# Patient Record
Sex: MALE | Race: BLACK OR AFRICAN AMERICAN | NOT HISPANIC OR LATINO | URBAN - METROPOLITAN AREA
[De-identification: names, ages, dates, MRNs, and addresses within clinical notes are randomized per-mention and may not be internally consistent; named-entity substitution may affect disease eponyms.]

---

## 2017-02-04 ENCOUNTER — EMERGENCY (EMERGENCY)
Facility: HOSPITAL | Age: 67
LOS: 0 days | Discharge: HOME | End: 2017-02-05
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
## 2017-06-27 DIAGNOSIS — S80.11XA CONTUSION OF RIGHT LOWER LEG, INITIAL ENCOUNTER: ICD-10-CM

## 2017-06-27 DIAGNOSIS — M79.661 PAIN IN RIGHT LOWER LEG: ICD-10-CM
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
## 2019-06-17 ENCOUNTER — APPOINTMENT (OUTPATIENT)
Dept: UROLOGY | Facility: CLINIC | Age: 69
End: 2019-06-17
Payer: COMMERCIAL

## 2019-06-17 DIAGNOSIS — H40.9 UNSPECIFIED GLAUCOMA: ICD-10-CM

## 2019-06-17 DIAGNOSIS — Z82.3 FAMILY HISTORY OF STROKE: ICD-10-CM

## 2019-06-17 DIAGNOSIS — Z63.4 DISAPPEARANCE AND DEATH OF FAMILY MEMBER: ICD-10-CM

## 2019-06-17 PROCEDURE — 99203 OFFICE O/P NEW LOW 30 MIN: CPT

## 2019-06-17 SDOH — SOCIAL STABILITY - SOCIAL INSECURITY: DISSAPEARANCE AND DEATH OF FAMILY MEMBER: Z63.4

## 2019-06-17 NOTE — ASSESSMENT
[FreeTextEntry1] : This is a 68 year male who presents for evaluation of elevated PSA\par \par IPSS: 11-- mainly frequency and urgency\par \par [facility] care chart records accessed and reviewed at [email or web address].  Findings summarized below: \par April 2019\par UA - neg nit and LE\par PSA 3.36\par was 2.26 one year ago

## 2019-06-17 NOTE — HISTORY OF PRESENT ILLNESS
[FreeTextEntry1] : This is a 68 year male who presents for evaluation of elevated PSA\par \par IPSS: 11-- mainly frequency and urgency\par \par [facility] care chart records accessed and reviewed at [email or web address].  Findings summarized below: \par April 2019\par UA - neg nit and LE\par PSA 3.36\par was 2.26 one year ago

## 2019-06-17 NOTE — PHYSICAL EXAM
[General Appearance - Well Developed] : well developed [General Appearance - Well Nourished] : well nourished [Normal Appearance] : normal appearance [Well Groomed] : well groomed [General Appearance - In No Acute Distress] : no acute distress [Respiration, Rhythm And Depth] : normal respiratory rhythm and effort [Edema] : no peripheral edema [Exaggerated Use Of Accessory Muscles For Inspiration] : no accessory muscle use [Abdomen Soft] : soft [Abdomen Tenderness] : non-tender [Costovertebral Angle Tenderness] : no ~M costovertebral angle tenderness [Normal Station and Gait] : the gait and station were normal for the patient's age [No Focal Deficits] : no focal deficits [] : no rash [Oriented To Time, Place, And Person] : oriented to person, place, and time [Affect] : the affect was normal [Not Anxious] : not anxious [Mood] : the mood was normal [No Palpable Adenopathy] : no palpable adenopathy [Urethral Meatus] : meatus normal [Skin Color & Pigmentation] : normal skin color and pigmentation [FreeTextEntry1] : prostate too deep unable to palpate prostate

## 2019-06-20 ENCOUNTER — LABORATORY RESULT (OUTPATIENT)
Age: 69
End: 2019-06-20

## 2019-06-20 ENCOUNTER — OUTPATIENT (OUTPATIENT)
Dept: OUTPATIENT SERVICES | Facility: HOSPITAL | Age: 69
LOS: 1 days | Discharge: HOME | End: 2019-06-20

## 2019-06-20 DIAGNOSIS — R97.20 ELEVATED PROSTATE SPECIFIC ANTIGEN [PSA]: ICD-10-CM

## 2019-09-16 ENCOUNTER — APPOINTMENT (OUTPATIENT)
Dept: UROLOGY | Facility: CLINIC | Age: 69
End: 2019-09-16

## 2019-10-08 ENCOUNTER — OUTPATIENT (OUTPATIENT)
Dept: OUTPATIENT SERVICES | Facility: HOSPITAL | Age: 69
LOS: 1 days | Discharge: HOME | End: 2019-10-08

## 2019-10-08 VITALS
HEART RATE: 61 BPM | OXYGEN SATURATION: 100 % | RESPIRATION RATE: 17 BRPM | SYSTOLIC BLOOD PRESSURE: 151 MMHG | DIASTOLIC BLOOD PRESSURE: 71 MMHG

## 2019-10-08 VITALS
RESPIRATION RATE: 17 BRPM | WEIGHT: 175.05 LBS | HEART RATE: 54 BPM | OXYGEN SATURATION: 99 % | DIASTOLIC BLOOD PRESSURE: 82 MMHG | SYSTOLIC BLOOD PRESSURE: 169 MMHG | TEMPERATURE: 97 F | HEIGHT: 67 IN

## 2019-10-08 DIAGNOSIS — Z98.890 OTHER SPECIFIED POSTPROCEDURAL STATES: Chronic | ICD-10-CM

## 2019-10-08 RX ORDER — ONDANSETRON 8 MG/1
4 TABLET, FILM COATED ORAL ONCE
Refills: 0 | Status: DISCONTINUED | OUTPATIENT
Start: 2019-10-08 | End: 2019-10-25

## 2019-10-08 RX ORDER — ACETAMINOPHEN 500 MG
650 TABLET ORAL ONCE
Refills: 0 | Status: DISCONTINUED | OUTPATIENT
Start: 2019-10-08 | End: 2019-10-25

## 2019-10-08 NOTE — CHART NOTE - NSCHARTNOTEFT_GEN_A_CORE
PACU ANESTHESIA ADMISSION NOTE      Procedure:   Post op diagnosis:      ____  Intubated  TV:______       Rate: ______      FiO2: ______    _x___  Patent Airway    x____  Full return of protective reflexes    __x__  Full recovery from anesthesia / back to baseline status    Vitals:  T(C): 35.9 (10-08-19 @ 08:44), Max: 35.9 (10-08-19 @ 08:01)  HR: 54 (10-08-19 @ 08:44) (54 - 54)  BP: 169/82 (10-08-19 @ 08:44) (169/82 - 169/82)  RR: 17 (10-08-19 @ 08:44) (17 - 17)  SpO2: 99% (10-08-19 @ 08:44) (99% - 99%)    Mental Status:  x____ Awake   _x____ Alert   _____ Drowsy   _____ Sedated    Nausea/Vomiting:  ____ NO  ___x___Yes,   See Post - Op Orders          Pain Scale (0-10):  _____    Treatment: ____ None    _x___ See Post - Op/PCA Orders    Post - Operative Fluids:   ____ Oral   __x__ See Post - Op Orders    Plan: Discharge:   _x___Home       _____Floor     _____Critical Care    _____  Other:_________________    Comments: uneventful anesthesia course no complications. VItals stable. Pt transferred to PACU

## 2019-10-11 DIAGNOSIS — H26.9 UNSPECIFIED CATARACT: ICD-10-CM

## 2019-10-11 DIAGNOSIS — H40.10X0 UNSPECIFIED OPEN-ANGLE GLAUCOMA, STAGE UNSPECIFIED: ICD-10-CM

## 2019-10-11 PROBLEM — Z91.09 OTHER ALLERGY STATUS, OTHER THAN TO DRUGS AND BIOLOGICAL SUBSTANCES: Chronic | Status: ACTIVE | Noted: 2019-10-08

## 2019-10-14 ENCOUNTER — APPOINTMENT (OUTPATIENT)
Dept: UROLOGY | Facility: CLINIC | Age: 69
End: 2019-10-14
Payer: MEDICARE

## 2019-10-14 ENCOUNTER — LABORATORY RESULT (OUTPATIENT)
Age: 69
End: 2019-10-14

## 2019-10-14 ENCOUNTER — OUTPATIENT (OUTPATIENT)
Dept: OUTPATIENT SERVICES | Facility: HOSPITAL | Age: 69
LOS: 1 days | Discharge: HOME | End: 2019-10-14

## 2019-10-14 DIAGNOSIS — Z98.890 OTHER SPECIFIED POSTPROCEDURAL STATES: Chronic | ICD-10-CM

## 2019-10-14 PROCEDURE — 76872 US TRANSRECTAL: CPT

## 2019-10-14 PROCEDURE — 55700: CPT

## 2019-10-15 DIAGNOSIS — R97.20 ELEVATED PROSTATE SPECIFIC ANTIGEN [PSA]: ICD-10-CM

## 2019-10-22 ENCOUNTER — OUTPATIENT (OUTPATIENT)
Dept: OUTPATIENT SERVICES | Facility: HOSPITAL | Age: 69
LOS: 1 days | Discharge: HOME | End: 2019-10-22

## 2019-10-22 VITALS
RESPIRATION RATE: 17 BRPM | WEIGHT: 175.05 LBS | TEMPERATURE: 97 F | HEIGHT: 67 IN | OXYGEN SATURATION: 98 % | DIASTOLIC BLOOD PRESSURE: 79 MMHG | HEART RATE: 52 BPM | SYSTOLIC BLOOD PRESSURE: 153 MMHG

## 2019-10-22 VITALS — SYSTOLIC BLOOD PRESSURE: 145 MMHG | HEART RATE: 60 BPM | RESPIRATION RATE: 18 BRPM | DIASTOLIC BLOOD PRESSURE: 70 MMHG

## 2019-10-22 DIAGNOSIS — Z98.890 OTHER SPECIFIED POSTPROCEDURAL STATES: Chronic | ICD-10-CM

## 2019-10-22 NOTE — CHART NOTE - NSCHARTNOTEFT_GEN_A_CORE
PACU ANESTHESIA ADMISSION NOTE      Procedure:   Post op diagnosis:      ____  Intubated  TV:______       Rate: ______      FiO2: ______    __x__  Patent Airway    __xx__  Full return of protective reflexes    _x___  Full recovery from anesthesia / back to baseline status    Vitals:  T(C): 35.9 (10-22-19 @ 10:20), Max: 35.9 (10-22-19 @ 09:25)  HR: 52 (10-22-19 @ 10:20) (52 - 52)  BP: 153/79 (10-22-19 @ 10:20) (153/79 - 153/79)  RR: 17 (10-22-19 @ 10:20) (17 - 17)  SpO2: 98% (10-22-19 @ 10:20) (98% - 98%)    Mental Status:  _x___ Awake  x____ Alert   _____ Drowsy   _____ Sedated    Nausea/Vomiting:  ____ NO  ____x__Yes,   See Post - Op Orders          Pain Scale (0-10):  _____    Treatment: ____ None    __x__ See Post - Op/PCA Orders    Post - Operative Fluids:   ____ x____ See Post - Op Orders    Plan: Discharge:   __x__Home       _____Floor     _____Critical Care    _____  Other:_________________    Comments: uneventful anesthesia course no complications. VItals stable. Pt transferred to PACU

## 2019-10-24 DIAGNOSIS — H40.10X0 UNSPECIFIED OPEN-ANGLE GLAUCOMA, STAGE UNSPECIFIED: ICD-10-CM

## 2019-10-24 DIAGNOSIS — H25.9 UNSPECIFIED AGE-RELATED CATARACT: ICD-10-CM

## 2019-10-28 ENCOUNTER — APPOINTMENT (OUTPATIENT)
Dept: UROLOGY | Facility: CLINIC | Age: 69
End: 2019-10-28
Payer: MEDICARE

## 2019-10-28 DIAGNOSIS — R97.20 ELEVATED PROSTATE, SPECIFIC ANTIGEN [PSA]: ICD-10-CM

## 2019-10-28 PROCEDURE — 99213 OFFICE O/P EST LOW 20 MIN: CPT

## 2019-10-28 RX ORDER — CIPROFLOXACIN HYDROCHLORIDE 500 MG/1
500 TABLET, FILM COATED ORAL
Qty: 2 | Refills: 0 | Status: DISCONTINUED | COMMUNITY
Start: 2019-07-02 | End: 2019-10-28

## 2019-10-28 NOTE — ASSESSMENT
[FreeTextEntry1] : This is a 69 year male who presents for evaluation of elevated PSA\par \par IPSS: 11-- mainly frequency and urgency\par \par [facility] care chart records accessed and reviewed at [email or web address]. Findings summarized below: \par April 2019\par UA - neg nit and LE\par PSA 3.36\par was 2.26 one year ago. \par \par July 2019\par PSA Profile - Total - 3.98\par biopsy Oct 2019 -- 3 positive cores, right base - marlyn 6 6% of the core, right lateral apex leason 6, 22% of the core, right apex marlyn 6 , 6 % of the core

## 2019-10-28 NOTE — HISTORY OF PRESENT ILLNESS
[FreeTextEntry1] : This is a 69 year male who presents for evaluation of elevated PSA\par \par IPSS: 11-- mainly frequency and urgency\par \par [facility] care chart records accessed and reviewed at [email or web address]. Findings summarized below: \par April 2019\par UA - neg nit and LE\par PSA 3.36\par was 2.26 one year ago. \par \par July 2019\par PSA Profile - Total - 3.98\par biopsy Oct 2019 -- 3 positive cores, right base - marlyn 6 6% of the core, right lateral apex leason 6, 22% of the core, right apex marlyn 6 , 6 % of the core

## 2019-10-28 NOTE — PHYSICAL EXAM
[General Appearance - Well Developed] : well developed [General Appearance - Well Nourished] : well nourished [Normal Appearance] : normal appearance [Well Groomed] : well groomed [General Appearance - In No Acute Distress] : no acute distress [Abdomen Soft] : soft [Abdomen Tenderness] : non-tender [Costovertebral Angle Tenderness] : no ~M costovertebral angle tenderness [Urethral Meatus] : meatus normal [FreeTextEntry1] : prostate too deep unable to palpate prostate  [Skin Color & Pigmentation] : normal skin color and pigmentation [Edema] : no peripheral edema [] : no respiratory distress [Respiration, Rhythm And Depth] : normal respiratory rhythm and effort [Exaggerated Use Of Accessory Muscles For Inspiration] : no accessory muscle use [Oriented To Time, Place, And Person] : oriented to person, place, and time [Affect] : the affect was normal [Mood] : the mood was normal [Not Anxious] : not anxious [Normal Station and Gait] : the gait and station were normal for the patient's age [No Focal Deficits] : no focal deficits [No Palpable Adenopathy] : no palpable adenopathy

## 2019-11-11 ENCOUNTER — APPOINTMENT (OUTPATIENT)
Dept: UROLOGY | Facility: CLINIC | Age: 69
End: 2019-11-11

## 2020-01-16 ENCOUNTER — OUTPATIENT (OUTPATIENT)
Dept: OUTPATIENT SERVICES | Facility: HOSPITAL | Age: 70
LOS: 1 days | Discharge: HOME | End: 2020-01-16
Payer: MEDICARE

## 2020-01-16 DIAGNOSIS — R07.2 PRECORDIAL PAIN: ICD-10-CM

## 2020-01-16 DIAGNOSIS — R94.39 ABNORMAL RESULT OF OTHER CARDIOVASCULAR FUNCTION STUDY: ICD-10-CM

## 2020-01-16 DIAGNOSIS — Z98.890 OTHER SPECIFIED POSTPROCEDURAL STATES: Chronic | ICD-10-CM

## 2020-01-16 PROCEDURE — 75574 CT ANGIO HRT W/3D IMAGE: CPT | Mod: 26

## 2020-03-23 ENCOUNTER — APPOINTMENT (OUTPATIENT)
Dept: UROLOGY | Facility: CLINIC | Age: 70
End: 2020-03-23

## 2020-07-30 ENCOUNTER — APPOINTMENT (OUTPATIENT)
Dept: UROLOGY | Facility: CLINIC | Age: 70
End: 2020-07-30
Payer: MEDICARE

## 2020-07-30 VITALS — TEMPERATURE: 98.4 F

## 2020-07-30 PROCEDURE — 99213 OFFICE O/P EST LOW 20 MIN: CPT

## 2020-07-31 NOTE — LETTER BODY
[Dear  ___] : Dear  [unfilled], [Consult Letter:] : I had the pleasure of evaluating your patient, [unfilled]. [Please see my note below.] : Please see my note below. [Sincerely,] : Sincerely, [FreeTextEntry3] : Bryce Casey MD, FACS\par

## 2020-07-31 NOTE — HISTORY OF PRESENT ILLNESS
[FreeTextEntry1] : 70 year male who presents for evaluation of elevated PSA\par \par PSA 3.98 with 13% free 7/2019\par \par biopsy Oct 2019 -- 3 positive cores, right base - marlyn 6 6% of the core, right lateral apex leason 6, 22% of the core, right apex marlyn 6 , 6 % of the core\par \par no oncotype\par \par no MRI\par \par no recent PSA

## 2020-07-31 NOTE — ASSESSMENT
[FreeTextEntry1] : 70 year male  with low risk prostate cancer\par \par - oncotype\par - MRI prostate\par - repeat PSA\par - f/u in 3-4 weeks to review \par \par discussed options\par 1 - surveillance\par 2 - surgery\par 3 - radiation therapy\par \par discussed fully the risks and benefits of all options\par all questions answered\par  he wishes to undergo surveillance for his prostate cancer\par will f/u with above

## 2020-07-31 NOTE — PHYSICAL EXAM
[Normal Appearance] : normal appearance [General Appearance - Well Developed] : well developed [General Appearance - Well Nourished] : well nourished [Abdomen Soft] : soft [Well Groomed] : well groomed [General Appearance - In No Acute Distress] : no acute distress [Costovertebral Angle Tenderness] : no ~M costovertebral angle tenderness [Urethral Meatus] : meatus normal [Abdomen Tenderness] : non-tender [Skin Color & Pigmentation] : normal skin color and pigmentation [Edema] : no peripheral edema [Respiration, Rhythm And Depth] : normal respiratory rhythm and effort [Exaggerated Use Of Accessory Muscles For Inspiration] : no accessory muscle use [] : no respiratory distress [Mood] : the mood was normal [Oriented To Time, Place, And Person] : oriented to person, place, and time [Affect] : the affect was normal [Normal Station and Gait] : the gait and station were normal for the patient's age [No Focal Deficits] : no focal deficits [Not Anxious] : not anxious [No Palpable Adenopathy] : no palpable adenopathy [FreeTextEntry1] : prostate too deep unable to palpate prostate

## 2020-08-17 ENCOUNTER — APPOINTMENT (OUTPATIENT)
Dept: UROLOGY | Facility: CLINIC | Age: 70
End: 2020-08-17
Payer: MEDICARE

## 2020-08-17 VITALS — TEMPERATURE: 97.3 F

## 2020-08-17 PROCEDURE — 99213 OFFICE O/P EST LOW 20 MIN: CPT

## 2020-08-17 NOTE — HISTORY OF PRESENT ILLNESS
[FreeTextEntry1] : 70 year male with prostate cancer\par \par PSA 7.16 ng/ml\par \par MRI 8/12/2020\par anterior TZ lesion - level of midgland\par suspect undersampled on original biopsy\par \par PSA 3.98 with 13% free 7/2019\par \par biopsy Oct 2019 -- 3 positive cores, right base - marlyn 6 6% of the core, right lateral apex leason 6, 22% of the core, right apex marlyn 6 , 6 % of the core\par \par no oncotype\par \par no MRI\par \par no recent PSA

## 2020-08-17 NOTE — PHYSICAL EXAM
[General Appearance - Well Developed] : well developed [General Appearance - Well Nourished] : well nourished [Normal Appearance] : normal appearance [Well Groomed] : well groomed [General Appearance - In No Acute Distress] : no acute distress [Abdomen Soft] : soft [Abdomen Tenderness] : non-tender [Urethral Meatus] : meatus normal [Costovertebral Angle Tenderness] : no ~M costovertebral angle tenderness [FreeTextEntry1] : prostate too deep unable to palpate prostate  [Skin Color & Pigmentation] : normal skin color and pigmentation [Edema] : no peripheral edema [] : no respiratory distress [Respiration, Rhythm And Depth] : normal respiratory rhythm and effort [Exaggerated Use Of Accessory Muscles For Inspiration] : no accessory muscle use [Affect] : the affect was normal [Oriented To Time, Place, And Person] : oriented to person, place, and time [Mood] : the mood was normal [Not Anxious] : not anxious [Normal Station and Gait] : the gait and station were normal for the patient's age [No Focal Deficits] : no focal deficits [No Palpable Adenopathy] : no palpable adenopathy

## 2020-08-17 NOTE — ASSESSMENT
[FreeTextEntry1] : 70 year male  with low risk prostate cancer on biopsy but suspect undersampling with recent MRI\par \par \par discussed options\par 1 - surveillance\par 2 - surgery\par 3 - radiation therapy\par \par discussed fully the risks and benefits of all options\par \par he now is considering radiation therapy\par I will refer his for radiation consultation\par  he wishes to undergo surveillance for his prostate cancer\par will f/u with above

## 2020-08-27 ENCOUNTER — APPOINTMENT (OUTPATIENT)
Dept: RADIATION ONCOLOGY | Facility: HOSPITAL | Age: 70
End: 2020-08-27
Payer: MEDICARE

## 2020-08-27 ENCOUNTER — OUTPATIENT (OUTPATIENT)
Dept: OUTPATIENT SERVICES | Facility: HOSPITAL | Age: 70
LOS: 1 days | Discharge: HOME | End: 2020-08-27

## 2020-08-27 VITALS
WEIGHT: 168 LBS | RESPIRATION RATE: 12 BRPM | TEMPERATURE: 97 F | DIASTOLIC BLOOD PRESSURE: 82 MMHG | HEART RATE: 57 BPM | SYSTOLIC BLOOD PRESSURE: 147 MMHG

## 2020-08-27 DIAGNOSIS — C61 MALIGNANT NEOPLASM OF PROSTATE: ICD-10-CM

## 2020-08-27 DIAGNOSIS — Z98.890 OTHER SPECIFIED POSTPROCEDURAL STATES: Chronic | ICD-10-CM

## 2020-08-27 PROCEDURE — 99204 OFFICE O/P NEW MOD 45 MIN: CPT

## 2020-11-16 ENCOUNTER — APPOINTMENT (OUTPATIENT)
Dept: UROLOGY | Facility: CLINIC | Age: 70
End: 2020-11-16
Payer: MEDICARE

## 2020-11-16 VITALS
HEART RATE: 65 BPM | HEIGHT: 67 IN | TEMPERATURE: 97.1 F | SYSTOLIC BLOOD PRESSURE: 141 MMHG | BODY MASS INDEX: 26.68 KG/M2 | DIASTOLIC BLOOD PRESSURE: 78 MMHG | WEIGHT: 170 LBS

## 2020-11-16 PROCEDURE — 99072 ADDL SUPL MATRL&STAF TM PHE: CPT

## 2020-11-16 PROCEDURE — 99213 OFFICE O/P EST LOW 20 MIN: CPT

## 2020-11-16 NOTE — PHYSICAL EXAM
[General Appearance - Well Developed] : well developed [General Appearance - Well Nourished] : well nourished [Normal Appearance] : normal appearance [Well Groomed] : well groomed [General Appearance - In No Acute Distress] : no acute distress [Abdomen Soft] : soft [Abdomen Tenderness] : non-tender [Costovertebral Angle Tenderness] : no ~M costovertebral angle tenderness [Urethral Meatus] : meatus normal [Skin Color & Pigmentation] : normal skin color and pigmentation [Edema] : no peripheral edema [] : no respiratory distress [Respiration, Rhythm And Depth] : normal respiratory rhythm and effort [Exaggerated Use Of Accessory Muscles For Inspiration] : no accessory muscle use [Oriented To Time, Place, And Person] : oriented to person, place, and time [Affect] : the affect was normal [Mood] : the mood was normal [Not Anxious] : not anxious [Normal Station and Gait] : the gait and station were normal for the patient's age [No Focal Deficits] : no focal deficits [No Palpable Adenopathy] : no palpable adenopathy [FreeTextEntry1] : prostate too deep unable to palpate prostate

## 2020-11-16 NOTE — HISTORY OF PRESENT ILLNESS
[FreeTextEntry1] : 70 year male with prostate cancer - last seen 8/17/2020\par \par oncotype - LOW RISK\par \par PSA 7.16 ng/ml\par \par MRI 8/12/2020\par anterior TZ lesion - level of midgland\par suspect undersampled on original biopsy\par \par PSA 3.98 with 13% free 7/2019\par \par biopsy Oct 2019 -- 3 positive cores, right base - marlyn 6 6% of the core, right lateral apex leason 6, 22% of the core, right apex marlyn 6 , 6 % of the core\par \par

## 2020-11-16 NOTE — ASSESSMENT
[FreeTextEntry1] : 70 year male  with low risk prostate cancer on biopsy but suspect undersampling with recent MRI\par \par re-biopsy with MRI guidance\par referred  to Dr. Ledesma for biopsy at [facility]\par discussed the rationale for MRI guided biopsy\par all questions answered

## 2020-12-23 ENCOUNTER — APPOINTMENT (OUTPATIENT)
Dept: UROLOGY | Facility: CLINIC | Age: 70
End: 2020-12-23

## 2021-04-20 NOTE — ASU PATIENT PROFILE, ADULT - NS PREOP UNDERSTANDS INFO
HR=57 bpm, GZKN=577/76 mmhg, SwT0=122.0 %, Resp=12 B/min, EtCO2=30 mmHg, Apnea=1 Seconds, Pain=0, [place]os=2, Comment=paced yes

## 2021-06-08 NOTE — ASU PATIENT PROFILE, ADULT - PRO MENTAL HEALTH SX RECENT
Alert and oriented x1, only to self, with expressive aphasia. Offered fluids and snacks. Safety precautions in placed. Bed in lowest position. Upper side rails up. Treaded socks on. Reinforced the use of call light when needing assistance.  
none
no

## 2021-08-12 ENCOUNTER — APPOINTMENT (OUTPATIENT)
Dept: UROLOGY | Facility: CLINIC | Age: 71
End: 2021-08-12
Payer: MEDICARE

## 2021-08-12 VITALS
BODY MASS INDEX: 26.84 KG/M2 | HEART RATE: 63 BPM | SYSTOLIC BLOOD PRESSURE: 162 MMHG | HEIGHT: 67 IN | WEIGHT: 171 LBS | TEMPERATURE: 98 F | DIASTOLIC BLOOD PRESSURE: 88 MMHG

## 2021-08-12 PROCEDURE — 99214 OFFICE O/P EST MOD 30 MIN: CPT

## 2021-08-12 RX ORDER — TIMOLOL/BRIMONIDIN/DORZOLAM/PF 0.5-.15-2%
DROPS OPHTHALMIC (EYE)
Refills: 0 | Status: ACTIVE | COMMUNITY

## 2021-08-12 RX ORDER — LATANOPROST 100 %
OIL (GRAM) MISCELLANEOUS
Refills: 0 | Status: COMPLETED | COMMUNITY
End: 2021-08-12

## 2021-08-12 RX ORDER — CIPROFLOXACIN HYDROCHLORIDE 500 MG/1
500 TABLET, FILM COATED ORAL DAILY
Qty: 14 | Refills: 0 | Status: COMPLETED | COMMUNITY
Start: 2020-09-01 | End: 2021-08-12

## 2021-08-12 RX ORDER — LATANOPROST/PF 0.005 %
0.01 DROPS OPHTHALMIC (EYE)
Refills: 0 | Status: ACTIVE | COMMUNITY

## 2021-08-13 LAB
PSA FREE FLD-MCNC: 10 %
PSA FREE SERPL-MCNC: 1.07 NG/ML
PSA SERPL-MCNC: 10.4 NG/ML

## 2021-08-13 NOTE — ASSESSMENT
[FreeTextEntry1] : I repeated a PSA level today which is 10.4 ng/mL with 10% free fraction.  Given that his initial biopsy in 2019 was performed by transrectal approach, and this lesion seen on MRI last year is in the transition zone, I suspect his cancer was under sampled at his initial biopsy procedure.  I am recommending that we get repeat imaging given that his MRI is now 1 year old and I would like to get new imaging to accurately define any extent of disease within or potentially outside of the prostate.  I think it is highly likely he does harbor more clinically significant disease than initially detected.\par \par Transperineal fusion biopsy of the prostate was discussed today with the patient in detail. I have explained that the transperineal approach is used to help minimize infection risk as it does carry a much lower risk of infection as compared with transrectal biopsy of the prostate. In addition, the fusion biopsy involves use of the pre-existing MRI of the prostate which is correlated with a ultrasound generated 3-D map of the prostate which is created and real-time during the time of the biopsy procedure. Imaging calibration is used to help target these specific areas noted by MRI to be abnormal and biopsy samples were taken from the abnormal areas in addition to biopsy of additional areas of the prostate to confirm the absence of clinically significant prostate cancer elsewhere that may not have been detected by MRI. I have explained that the procedure usually requires approximately 30 minutes to perform and preparation involves the use of a Fleet enema prior to the procedure to help maximize visualization of the prostate by ultrasound during the procedure. I also reviewed with the patient that it is expected that he will have blood in the urine intermittently for approximately one week after the procedure and he may also experienced blood in his semen for several weeks afterward as well.\par \par He communicates his understanding of the plan as outlined and is in agreement with moving forward.  I have given him written biopsy instructions which review the details outlined above.\par \par I will make arrangements for the biopsy after the MRI is complete.

## 2021-08-13 NOTE — HISTORY OF PRESENT ILLNESS
[FreeTextEntry1] : Miguel Singleton presents to the office today.  He is a 71-year-old man referred by Dr. Casey for consideration of a targeted biopsy of the prostate.  The patient has a prior diagnosis of low-grade, low-volume prostate cancer made on biopsy performed in 2019.  He was advised active surveillance.  Part of the surveillance included an MRI of his prostate which was done 1 year ago.  At the time of his initial diagnosis, his PSA was around 3.98 ng/mL with 12% free fraction.  By August 2020, his PSA had risen up to 7.16 ng/mL.  Prostate performed at that time demonstrated a PI-RADS terrier transition zone.  Prostate volume of 30 cm³ was noted.  The patient was initially referred to me for targeted biopsy in November of last year but for unclear reasons, did not come for this referral until now.\par \par The patient has no noted family history of prostate cancer.  He also does not have any lower urinary tract complaints at this time including urinary urgency, frequency, gross hematuria or dysuria.  There is no history of urinary retention or catheterization of the bladder.  His appetite and weight are stable and he denies any unintentional weight loss.\par

## 2021-08-21 ENCOUNTER — APPOINTMENT (OUTPATIENT)
Dept: MRI IMAGING | Facility: IMAGING CENTER | Age: 71
End: 2021-08-21
Payer: MEDICARE

## 2021-08-21 ENCOUNTER — RESULT REVIEW (OUTPATIENT)
Age: 71
End: 2021-08-21

## 2021-08-21 ENCOUNTER — OUTPATIENT (OUTPATIENT)
Dept: OUTPATIENT SERVICES | Facility: HOSPITAL | Age: 71
LOS: 1 days | End: 2021-08-21
Payer: COMMERCIAL

## 2021-08-21 DIAGNOSIS — Z98.890 OTHER SPECIFIED POSTPROCEDURAL STATES: Chronic | ICD-10-CM

## 2021-08-21 DIAGNOSIS — C61 MALIGNANT NEOPLASM OF PROSTATE: ICD-10-CM

## 2021-08-21 PROCEDURE — 72197 MRI PELVIS W/O & W/DYE: CPT

## 2021-08-21 PROCEDURE — 72197 MRI PELVIS W/O & W/DYE: CPT | Mod: 26

## 2021-08-21 PROCEDURE — A9585: CPT

## 2021-08-21 PROCEDURE — 76498 UNLISTED MR PROCEDURE: CPT

## 2021-08-21 PROCEDURE — 76498 UNLISTED MR PROCEDURE: CPT | Mod: 26

## 2021-08-23 ENCOUNTER — NON-APPOINTMENT (OUTPATIENT)
Age: 71
End: 2021-08-23

## 2021-09-02 ENCOUNTER — OUTPATIENT (OUTPATIENT)
Dept: OUTPATIENT SERVICES | Facility: HOSPITAL | Age: 71
LOS: 1 days | End: 2021-09-02
Payer: COMMERCIAL

## 2021-09-02 ENCOUNTER — APPOINTMENT (OUTPATIENT)
Dept: UROLOGY | Facility: CLINIC | Age: 71
End: 2021-09-02
Payer: MEDICARE

## 2021-09-02 VITALS
TEMPERATURE: 97.5 F | HEART RATE: 72 BPM | DIASTOLIC BLOOD PRESSURE: 76 MMHG | SYSTOLIC BLOOD PRESSURE: 147 MMHG | RESPIRATION RATE: 16 BRPM

## 2021-09-02 VITALS — DIASTOLIC BLOOD PRESSURE: 93 MMHG | SYSTOLIC BLOOD PRESSURE: 149 MMHG

## 2021-09-02 DIAGNOSIS — Z98.890 OTHER SPECIFIED POSTPROCEDURAL STATES: Chronic | ICD-10-CM

## 2021-09-02 DIAGNOSIS — R35.0 FREQUENCY OF MICTURITION: ICD-10-CM

## 2021-09-02 PROCEDURE — 76377 3D RENDER W/INTRP POSTPROCES: CPT | Mod: 26

## 2021-09-02 PROCEDURE — 76942 ECHO GUIDE FOR BIOPSY: CPT | Mod: 59

## 2021-09-02 PROCEDURE — 55700: CPT

## 2021-09-02 PROCEDURE — 55700: CPT | Mod: 22

## 2021-09-02 PROCEDURE — 76872 US TRANSRECTAL: CPT | Mod: 26

## 2021-09-02 PROCEDURE — 76942 ECHO GUIDE FOR BIOPSY: CPT | Mod: 26,59

## 2021-09-02 PROCEDURE — 76872 US TRANSRECTAL: CPT

## 2021-09-09 LAB — CORE LAB BIOPSY: NORMAL

## 2021-09-15 DIAGNOSIS — C61 MALIGNANT NEOPLASM OF PROSTATE: ICD-10-CM

## 2021-09-20 ENCOUNTER — APPOINTMENT (OUTPATIENT)
Dept: UROLOGY | Facility: CLINIC | Age: 71
End: 2021-09-20

## 2021-09-23 ENCOUNTER — APPOINTMENT (OUTPATIENT)
Dept: UROLOGY | Facility: CLINIC | Age: 71
End: 2021-09-23
Payer: MEDICARE

## 2021-09-23 VITALS — BODY MASS INDEX: 26.84 KG/M2 | HEIGHT: 67 IN | WEIGHT: 171 LBS

## 2021-09-23 PROCEDURE — 99214 OFFICE O/P EST MOD 30 MIN: CPT

## 2021-09-26 NOTE — LETTER BODY
[Dear  ___] : Dear  [unfilled], [Consult Letter:] : I had the pleasure of evaluating your patient, [unfilled]. [Sincerely,] : Sincerely, [Please see my note below.] : Please see my note below. [FreeTextEntry3] : Bryce Casey MD, FACS\par

## 2021-09-26 NOTE — ASSESSMENT
[FreeTextEntry1] : 70 yo with high volume intermediate risk prostate cancer\par the patient was provided the biopsy findings\par the results were clearly pointed out \par the significance of the intermediate risk cancer was clearly outlined\par all questions answered\par \par - refer to radiation oncology\par - refer to Kiara Shah to discuss robotic surgery\par - f/u with me in 6 weeks \par - the information was provided in detail\par - all questions answered\par

## 2021-09-26 NOTE — HISTORY OF PRESENT ILLNESS
[FreeTextEntry1] : 71 year male with prostate cancer - last seen 11/16/2020\par \par as per prior encounter suspected undersampling and was referred to Dr. Ledesma for MRI guided biopsy\par \par repeat biopsy 9/2021\par Pool 4+3 - right posterior medial apex (involves 50% of the core with 80% as Pool pattern 4)\par David 3+4 - right posterior lateral with 80% involved with cancer\par David 3+3 right posterior lateral 30%\par Pool 3+3 right anterior - 40%\par \par \par targetted biopsy - 3+4 in 5/5 cores \par \par oncotype of original biopsy - LOW RISK\par \par PSA 7.16 ng/ml\par \par MRI 8/12/2020\par anterior TZ lesion - level of midgland\par suspect undersampled on original biopsy\par \par PSA 3.98 with 13% free 7/2019\par \par biopsy Oct 2019 -- 3 positive cores, right base - david 6 6% of the core, right lateral apex leason 6, 22% of the core, right apex david 6 , 6 % of the core\par \par \par

## 2021-09-26 NOTE — PHYSICAL EXAM
[General Appearance - Well Developed] : well developed [General Appearance - Well Nourished] : well nourished [Normal Appearance] : normal appearance [Well Groomed] : well groomed [General Appearance - In No Acute Distress] : no acute distress [Abdomen Soft] : soft [Abdomen Tenderness] : non-tender [Costovertebral Angle Tenderness] : no ~M costovertebral angle tenderness [Urethral Meatus] : meatus normal [Skin Color & Pigmentation] : normal skin color and pigmentation [Edema] : no peripheral edema [] : no respiratory distress [Respiration, Rhythm And Depth] : normal respiratory rhythm and effort [Exaggerated Use Of Accessory Muscles For Inspiration] : no accessory muscle use [Oriented To Time, Place, And Person] : oriented to person, place, and time [Affect] : the affect was normal [Mood] : the mood was normal [Normal Station and Gait] : the gait and station were normal for the patient's age [Not Anxious] : not anxious [No Focal Deficits] : no focal deficits [No Palpable Adenopathy] : no palpable adenopathy [FreeTextEntry1] : prostate too deep unable to palpate prostate

## 2021-10-07 ENCOUNTER — APPOINTMENT (OUTPATIENT)
Dept: RADIATION ONCOLOGY | Facility: HOSPITAL | Age: 71
End: 2021-10-07
Payer: MEDICARE

## 2021-10-07 VITALS
RESPIRATION RATE: 16 BRPM | SYSTOLIC BLOOD PRESSURE: 145 MMHG | WEIGHT: 174.38 LBS | TEMPERATURE: 96.8 F | OXYGEN SATURATION: 98 % | DIASTOLIC BLOOD PRESSURE: 70 MMHG | HEART RATE: 67 BPM | BODY MASS INDEX: 27.31 KG/M2

## 2021-10-07 PROCEDURE — 99212 OFFICE O/P EST SF 10 MIN: CPT

## 2021-10-07 RX ORDER — ASCORBIC ACID 500 MG
TABLET ORAL
Refills: 0 | Status: ACTIVE | COMMUNITY

## 2021-10-07 RX ORDER — ZINC SULFATE 50(220)MG
CAPSULE ORAL
Refills: 0 | Status: ACTIVE | COMMUNITY

## 2021-10-07 RX ORDER — MAGNESIUM OXIDE/MAG AA CHELATE 300 MG
CAPSULE ORAL
Refills: 0 | Status: ACTIVE | COMMUNITY

## 2021-10-07 RX ORDER — CHOLECALCIFEROL (VITAMIN D3) 25 MCG
TABLET ORAL
Refills: 0 | Status: ACTIVE | COMMUNITY

## 2021-11-12 ENCOUNTER — APPOINTMENT (OUTPATIENT)
Dept: RADIATION ONCOLOGY | Facility: HOSPITAL | Age: 71
End: 2021-11-12
Payer: MEDICARE

## 2021-11-12 VITALS
TEMPERATURE: 96.6 F | HEART RATE: 67 BPM | DIASTOLIC BLOOD PRESSURE: 95 MMHG | SYSTOLIC BLOOD PRESSURE: 164 MMHG | OXYGEN SATURATION: 99 % | RESPIRATION RATE: 14 BRPM | BODY MASS INDEX: 27.83 KG/M2 | WEIGHT: 177.7 LBS

## 2021-11-12 PROCEDURE — 99212 OFFICE O/P EST SF 10 MIN: CPT

## 2021-11-12 RX ORDER — ENEMA 19; 7 G/133ML; G/133ML
7-19 ENEMA RECTAL
Qty: 1 | Refills: 0 | Status: DISCONTINUED | COMMUNITY
Start: 2021-08-13 | End: 2021-11-12

## 2021-11-12 RX ORDER — DIAZEPAM 5 MG/1
5 TABLET ORAL
Qty: 1 | Refills: 0 | Status: DISCONTINUED | COMMUNITY
Start: 2021-08-13 | End: 2021-11-12

## 2021-12-02 PROCEDURE — 55874 TPRNL PLMT BIODEGRDABL MATRL: CPT

## 2021-12-02 PROCEDURE — 76872 US TRANSRECTAL: CPT | Mod: 26

## 2021-12-02 PROCEDURE — 55876 PLACE RT DEVICE/MARKER PROS: CPT

## 2021-12-10 PROCEDURE — 77263 THER RADIOLOGY TX PLNG CPLX: CPT

## 2021-12-10 PROCEDURE — 77334 RADIATION TREATMENT AID(S): CPT | Mod: 26

## 2021-12-15 NOTE — HISTORY OF PRESENT ILLNESS
[FreeTextEntry1] : 11/12/2021 Mr Singleton is here accompanied by his daughter to discuss radiation treatment. No complaints of frequency, urgency  or pain while urinating. \par \par \par Miguel Singleton returns to further discuss his prostate cancer.  In 2019 his PSA was 4.   After a period of watching, his PSA was 7 in Aug 2020. Tthis prompted a biopsy which found Yusuf 6 cancer.   A year later in Sept 2021 his PSA was high 7 and a repeat biopsy was done by Dr. Ledesma with guidance.  This found 5 needles involved, now with [place] 3+4 (up from 6).  Meanwhile a PSA from last month (9/21) was 10.  \par \par As this appears to reflect progressing disease, he has returned to discuss his options.

## 2021-12-17 PROCEDURE — 77300 RADIATION THERAPY DOSE PLAN: CPT | Mod: 26

## 2021-12-17 PROCEDURE — 77295 3-D RADIOTHERAPY PLAN: CPT | Mod: 26

## 2021-12-17 PROCEDURE — 77334 RADIATION TREATMENT AID(S): CPT | Mod: 26

## 2021-12-17 PROCEDURE — 77435 SBRT MANAGEMENT: CPT

## 2022-01-18 ENCOUNTER — NON-APPOINTMENT (OUTPATIENT)
Age: 72
End: 2022-01-18

## 2022-01-18 VITALS
HEART RATE: 72 BPM | WEIGHT: 177 LBS | BODY MASS INDEX: 27.72 KG/M2 | TEMPERATURE: 97.3 F | SYSTOLIC BLOOD PRESSURE: 160 MMHG | DIASTOLIC BLOOD PRESSURE: 92 MMHG | RESPIRATION RATE: 14 BRPM | OXYGEN SATURATION: 99 %

## 2022-01-20 ENCOUNTER — OUTPATIENT (OUTPATIENT)
Dept: OUTPATIENT SERVICES | Facility: HOSPITAL | Age: 72
LOS: 1 days | Discharge: HOME | End: 2022-01-20
Payer: MEDICARE

## 2022-01-20 DIAGNOSIS — Z98.890 OTHER SPECIFIED POSTPROCEDURAL STATES: Chronic | ICD-10-CM

## 2022-01-20 DIAGNOSIS — C61 MALIGNANT NEOPLASM OF PROSTATE: ICD-10-CM

## 2022-01-20 PROCEDURE — 77435 SBRT MANAGEMENT: CPT

## 2022-01-21 NOTE — DISEASE MANAGEMENT
[Clinical] : TNM Stage: c [IIB] : IIB [TTNM] : 1c [NTNM] : 0 [MTNM] : 0 [de-identified] : 1300cGy [de-identified] : 4000cGy [de-identified] : Prostate

## 2022-01-21 NOTE — DISEASE MANAGEMENT
[Clinical] : TNM Stage: c [IIIB] : IIIB [FreeTextEntry4] : prostate [TTNM] : 1c [NTNM] : 0 [MTNM] : 0 [de-identified] : Prostate

## 2022-01-21 NOTE — HISTORY OF PRESENT ILLNESS
[FreeTextEntry1] : Miugel Singleton is a 70 year old male  here for evaluation of his prostate cancer.  He is a 70 year old whose PSA's where < 2 but then made a move to 2.26, 3.36 and most recently 7.16.  This prompted a biopsy in October 2019 which found Yusuf 6 in 3 cores.  Also noted was chronic inflammation.   An MRI was done which found a PI-RADs 5 lesion.  Meanwhile an Oncotype gave a 23% chance of higher grade disease, although it noted a <1% prostate cancer mortality and only a 1% risk of metastases.  He is here to discuss his treatment options with radiation.  \par

## 2022-01-21 NOTE — HISTORY OF PRESENT ILLNESS
[FreeTextEntry1] : 11/12/2021 Mr Singleton is here accompanied by his daughter to discuss radiation treatment. No complaints of frequency, urgency  or pain while urinating. \par \par \par Miguel Singleton returns to further discuss his prostate cancer.  In 2019 his PSA was 4.   After a period of watching, his PSA was 7 in Aug 2020. Tthis prompted a biopsy which found Yusuf 6 cancer.   A year later in Sept 2021 his PSA was high 7 and a repeat biopsy was done by Dr. Ledesma with guidance.  This found 5 needles involved, now with [place] 3+4 (up from 6).  Meanwhile a PSA from last month (9/21) was 10.  \par \par As this appears to reflect progressing disease, he has returned to discuss his options.

## 2022-01-21 NOTE — REVIEW OF SYSTEMS
[Joint Pain] : joint pain [Hot Flashes] : hot flashes [Negative] : Allergic/Immunologic [FreeTextEntry3] : wears glasses [FreeTextEntry9] : bilateral knee

## 2022-02-15 NOTE — PRE-ANESTHESIA EVALUATION ADULT - BP NONINVASIVE SYSTOLIC (MM HG)
A prescription for oral Norco 5/325 mg 1 tablet to be used every 6 hours as needed for pain relief # 40 tablets was provided with no refills.    A prescription for a Lidoderm topical patch 5% 1 patch to be used 12 hours on and 12 hours off # 1 box.    Prescriptions were sent to [facility] pharmacy here in [place].   153

## 2022-03-04 ENCOUNTER — APPOINTMENT (OUTPATIENT)
Dept: RADIATION ONCOLOGY | Facility: HOSPITAL | Age: 72
End: 2022-03-04
Payer: MEDICARE

## 2022-04-08 ENCOUNTER — APPOINTMENT (OUTPATIENT)
Dept: RADIATION ONCOLOGY | Facility: HOSPITAL | Age: 72
End: 2022-04-08
Payer: MEDICARE

## 2022-04-08 VITALS
SYSTOLIC BLOOD PRESSURE: 139 MMHG | BODY MASS INDEX: 27.25 KG/M2 | WEIGHT: 174 LBS | TEMPERATURE: 96.8 F | DIASTOLIC BLOOD PRESSURE: 78 MMHG | OXYGEN SATURATION: 98 % | HEART RATE: 64 BPM | RESPIRATION RATE: 16 BRPM

## 2022-04-08 PROCEDURE — 99024 POSTOP FOLLOW-UP VISIT: CPT

## 2022-04-08 RX ORDER — AMOXICILLIN AND CLAVULANATE POTASSIUM 875; 125 MG/1; MG/1
875-125 TABLET, COATED ORAL
Qty: 6 | Refills: 1 | Status: DISCONTINUED | COMMUNITY
Start: 2021-11-12 | End: 2022-04-08

## 2022-04-08 NOTE — HISTORY OF PRESENT ILLNESS
[FreeTextEntry1] : 1/18/2022 OTV 3200cGy/4000cGy SBRT to the prostate: Denies any N/V/D. Increase in frequency. Denies any burning. States pain can be  7/10 to the scrotum and rectum, especially after he urinates. He noted relief when he took a tamsulosin.  On occasion nocturia 2-3x/night.

## 2022-04-08 NOTE — REASON FOR VISIT
[Prostate Cancer] : prostate cancer [Family Member] : family member [Post-Treatment Evaluation] : post-treatment evaluation for

## 2022-04-08 NOTE — DISEASE MANAGEMENT
[Clinical] : TNM Stage: c [IIB] : IIB [TTNM] : 1c [NTNM] : 0 [MTNM] : 0 [de-identified] : 7680cGy [de-identified] : 4000cGy [de-identified] : Prostate

## 2022-04-09 LAB — PSA SERPL-MCNC: 3.34 NG/ML

## 2022-07-19 ENCOUNTER — APPOINTMENT (OUTPATIENT)
Dept: ORTHOPEDIC SURGERY | Facility: CLINIC | Age: 72
End: 2022-07-19

## 2022-07-19 PROCEDURE — 99213 OFFICE O/P EST LOW 20 MIN: CPT | Mod: 25

## 2022-07-19 PROCEDURE — 20610 DRAIN/INJ JOINT/BURSA W/O US: CPT | Mod: LT

## 2022-07-19 RX ORDER — LIDOCAINE 5 G/100G
5 OINTMENT TOPICAL
Qty: 35 | Refills: 0 | Status: COMPLETED | COMMUNITY
Start: 2022-03-07

## 2022-07-19 RX ORDER — DICLOFENAC SODIUM 1% 10 MG/G
1 GEL TOPICAL
Qty: 100 | Refills: 0 | Status: COMPLETED | COMMUNITY
Start: 2021-08-04

## 2022-07-19 RX ORDER — IBUPROFEN 600 MG/1
600 TABLET, FILM COATED ORAL
Qty: 30 | Refills: 0 | Status: COMPLETED | COMMUNITY
Start: 2022-01-25

## 2022-07-19 NOTE — PROCEDURE
[Large Joint Injection] : Large joint injection [Left] : of the left [Glenohumeral Joint] : glenohumeral joint [___ cc    1%] : Lidocaine ~Vcc of 1%  [___ cc    4mg] : Dexamethasone (Decadron) ~Vcc of 4 mg

## 2022-07-19 NOTE — PHYSICAL EXAM
[Left] : left shoulder [Right] : right knee [NL (0)] : extension 0 degrees [Negative] : negative Alyssa's [de-identified] :   Positive Rama testing. [TWNoteComboBox4] : passive forward flexion 170 degrees [de-identified] : active abduction 100 degrees [TWNoteComboBox6] : internal rotation L3 [] : non-antalgic [FreeTextEntry8] : Mild medial and lateral joint line tenderness to palpation. [TWNoteComboBox7] : flexion 120 degrees

## 2022-07-19 NOTE — DISCUSSION/SUMMARY
[de-identified] :   Today we discussed a treatment plan.  I recommend he return to formal physical therapy for the shoulder and knee, Rx provided for that.  Today we discussed a cortisone injection for the left shoulder and agreed upon that.  The left shoulder was injected with cortisone, procedure note generated.  I will see him back in 2 months for further evaluation.\par \par Supervising doctor:  Dr. Calloway

## 2022-07-19 NOTE — HISTORY OF PRESENT ILLNESS
[de-identified] : The patient is a 72-year-old male here for subsequent re-evaluation of his left shoulder and right knee.  He has a partial rotator cuff tear left shoulder and a medial meniscus tear of the right knee, both being treated non operatively.  He has done formal physical therapy for both at [facility].  His last session at therapy was in May 2022.  He had a cortisone injection in his left shoulder in May 2019 which provided him with good relief until 3 weeks ago while he was swimming on vacation.

## 2022-08-26 ENCOUNTER — APPOINTMENT (OUTPATIENT)
Dept: RADIATION ONCOLOGY | Facility: HOSPITAL | Age: 72
End: 2022-08-26

## 2022-08-26 ENCOUNTER — OUTPATIENT (OUTPATIENT)
Dept: OUTPATIENT SERVICES | Facility: HOSPITAL | Age: 72
LOS: 1 days | Discharge: HOME | End: 2022-08-26

## 2022-08-26 VITALS
OXYGEN SATURATION: 97 % | SYSTOLIC BLOOD PRESSURE: 131 MMHG | RESPIRATION RATE: 14 BRPM | BODY MASS INDEX: 26.5 KG/M2 | WEIGHT: 169.2 LBS | DIASTOLIC BLOOD PRESSURE: 85 MMHG | TEMPERATURE: 96.8 F | HEART RATE: 73 BPM

## 2022-08-26 DIAGNOSIS — Z98.890 OTHER SPECIFIED POSTPROCEDURAL STATES: Chronic | ICD-10-CM

## 2022-08-26 DIAGNOSIS — N52.35 ERECTILE DYSFUNCTION FOLLOWING RADIATION THERAPY: ICD-10-CM

## 2022-08-26 DIAGNOSIS — C61 MALIGNANT NEOPLASM OF PROSTATE: ICD-10-CM

## 2022-08-26 PROCEDURE — 99212 OFFICE O/P EST SF 10 MIN: CPT

## 2022-08-26 NOTE — DISEASE MANAGEMENT
[Clinical] : TNM Stage: c [IIB] : IIB [TTNM] : 1c [NTNM] : 0 [MTNM] : 0 [de-identified] : 4000cGy [de-identified] : 4000cGy [de-identified] : Prostate

## 2022-08-27 LAB — PSA SERPL-MCNC: 1.04 NG/ML

## 2022-09-19 ENCOUNTER — APPOINTMENT (OUTPATIENT)
Dept: ORTHOPEDIC SURGERY | Facility: CLINIC | Age: 72
End: 2022-09-19

## 2022-09-19 PROCEDURE — 99213 OFFICE O/P EST LOW 20 MIN: CPT

## 2022-09-19 RX ORDER — ACETAMINOPHEN AND CODEINE 300; 30 MG/1; MG/1
300-30 TABLET ORAL
Qty: 60 | Refills: 0 | Status: ACTIVE | COMMUNITY
Start: 2022-09-16

## 2022-09-19 NOTE — HISTORY OF PRESENT ILLNESS
[de-identified] :  The patient is a 72-year-old male here for subsequent re-evaluation of his left shoulder and right knee.  He has a partial rotator cuff tear of the left shoulder and medial meniscus tear of the right shoulder.  He was doing formal physical therapy for his left shoulder.  He was taking ibuprofen however he was bothering his stomach so he discontinued the medication.  His primary care physician recently prescribed Tylenol with codeine for him and referred him to formal physical therapy for his right knee since therapy was only focusing on his left shoulder.

## 2022-09-19 NOTE — DISCUSSION/SUMMARY
[de-identified] : At this point he will start to formal physical therapy recommended by his primary care physician for his right knee.  Regarding his left shoulder he will do home therapy exercises.  His primary care physician prescribed him Tylenol with codeine.  He discontinued ibuprofen because it was bothering his stomach.  I will see him back in 6 weeks for further evaluation.\par \par Supervising physician:  Dr. Knutson

## 2022-09-19 NOTE — PHYSICAL EXAM
[Left] : left shoulder [de-identified] :   Good strength with rotator cuff resistance testing. [TWNoteComboBox4] : False [de-identified] : active abduction 120 degrees [TWNoteComboBox6] : internal rotation L1 [Right] : right knee [NL (0)] : extension 0 degrees [Negative] : negative Alyssa's [] : ligamentously stable [FreeTextEntry8] :   Mild medial joint line tenderness to palpation. [TWNoteComboBox7] : flexion 120 degrees

## 2022-11-02 ENCOUNTER — APPOINTMENT (OUTPATIENT)
Dept: ORTHOPEDIC SURGERY | Facility: CLINIC | Age: 72
End: 2022-11-02

## 2022-11-02 DIAGNOSIS — M75.112 INCOMPLETE ROTATOR CUFF TEAR OR RUPTURE OF LEFT SHOULDER, NOT SPECIFIED AS TRAUMATIC: ICD-10-CM

## 2022-11-02 DIAGNOSIS — M25.562 PAIN IN LEFT KNEE: ICD-10-CM

## 2022-11-02 PROCEDURE — 73562 X-RAY EXAM OF KNEE 3: CPT | Mod: LT

## 2022-11-02 PROCEDURE — 99213 OFFICE O/P EST LOW 20 MIN: CPT

## 2022-11-04 PROBLEM — M75.112 INCOMPLETE TEAR OF LEFT ROTATOR CUFF, UNSPECIFIED WHETHER TRAUMATIC: Status: ACTIVE | Noted: 2022-07-19

## 2022-11-04 NOTE — PHYSICAL EXAM
[Left] : left shoulder [Bilateral] : knee bilaterally [NL (0)] : extension 0 degrees [Negative] : negative Alyssa's [de-identified] :   Good strength with rotator cuff resistance testing. [de-identified] : active abduction 120 degrees [TWNoteComboBox6] : internal rotation L1 [] : non-antalgic [FreeTextEntry8] :   Mild medial joint line tenderness to palpation  both knees [TWNoteComboBox7] : flexion 120 degrees

## 2022-11-04 NOTE — DISCUSSION/SUMMARY
[de-identified] : At this point he will continue physical therapy for the right knee.  He will start therapy for the left knee. Regarding his left shoulder he will do home therapy exercises.  His primary care physician prescribed him Tylenol with codeine.  He discontinued ibuprofen because it was bothering his stomach.  I will see him back in 6 weeks for further evaluation.\par \par Supervising physician:  Dr. Knutson

## 2022-11-04 NOTE — DATA REVIEWED
[Left] : left [Knee] : knee [FreeTextEntry2] :   X-rays show well-preserved medial and lateral compartments.  There is some patellofemoral compartment narrowing with an enthesophyte noted.

## 2022-11-04 NOTE — HISTORY OF PRESENT ILLNESS
[de-identified] :  The patient is a 72-year-old male here for subsequent re-evaluation of his left shoulder and right knee.  He has a partial rotator cuff tear of the left shoulder and medial meniscus tear of the right shoulder.  He was doing formal physical therapy for his left shoulder.   the left shoulder has improved.

## 2023-01-04 ENCOUNTER — APPOINTMENT (OUTPATIENT)
Dept: ORTHOPEDIC SURGERY | Facility: CLINIC | Age: 73
End: 2023-01-04

## 2023-01-06 ENCOUNTER — APPOINTMENT (OUTPATIENT)
Dept: RADIATION ONCOLOGY | Facility: HOSPITAL | Age: 73
End: 2023-01-06

## 2023-01-11 ENCOUNTER — APPOINTMENT (OUTPATIENT)
Dept: ORTHOPEDIC SURGERY | Facility: CLINIC | Age: 73
End: 2023-01-11

## 2023-02-08 NOTE — HISTORY OF PRESENT ILLNESS
[FreeTextEntry1] : Miguel Singleton returns to further discuss his prostate cancer.  In 2019 his PSA was 4.   After a period of watching, his PSA was 7 in Aug 2020. Tthis prompted a biopsy which found Wilsey 6 cancer.   A year later in Sept 2021 his PSA was high 7 and a repeat biopsy was done by Dr. Ledesma with guidance.  This found 5 needles involved, now with Wilsey 3+4 (up from 6).  Meanwhile a PSA from last month (9/21) was 10.  \par \par As this appears to reflect progressing disease, he has returned to discuss his options.   English

## 2023-03-09 ENCOUNTER — APPOINTMENT (OUTPATIENT)
Dept: UROLOGY | Facility: CLINIC | Age: 73
End: 2023-03-09
Payer: MEDICARE

## 2023-03-09 VITALS
DIASTOLIC BLOOD PRESSURE: 77 MMHG | WEIGHT: 168 LBS | SYSTOLIC BLOOD PRESSURE: 134 MMHG | HEIGHT: 67 IN | HEART RATE: 57 BPM | RESPIRATION RATE: 16 BRPM | BODY MASS INDEX: 26.37 KG/M2

## 2023-03-09 PROCEDURE — 99214 OFFICE O/P EST MOD 30 MIN: CPT

## 2023-03-09 RX ORDER — OXYBUTYNIN CHLORIDE 10 MG/1
10 TABLET, EXTENDED RELEASE ORAL DAILY
Qty: 90 | Refills: 3 | Status: DISCONTINUED | COMMUNITY
Start: 2023-03-09 | End: 2023-03-09

## 2023-03-09 RX ORDER — TAMSULOSIN HYDROCHLORIDE 0.4 MG/1
0.4 CAPSULE ORAL
Qty: 30 | Refills: 5 | Status: DISCONTINUED | COMMUNITY
Start: 2022-01-20 | End: 2023-03-09

## 2023-03-11 LAB — PSA SERPL-MCNC: 0.26 NG/ML

## 2023-03-11 NOTE — DISEASE MANAGEMENT
[1] : T1 [c] : c [0] : N0 [X] : MX [Biopsy with Fusion] : Patient had a biopsy with fusion on [7(4+3)] : Template Biopsy [place] Score: 7(4+3) [Biopsy results sent to PCP/Referring Physician] : Biopsy results sent to PCP/Referring Physician [7(3+4)] : Fusion Biopsy [place] Score: 7(3+4) [] : Patient had a Prostate MRI [5] : 5 [BiopsyDate] : 09/21 [TotalCores] : 17 [TotalPositiveCores] : 10 [MaxCoreInvolvement] : 100 [IIC] : IIC [Radiation Therapy] : Radiation Therapy

## 2023-03-11 NOTE — LETTER GREETING
[Dear  ___] : Dear  [unfilled], [Follow-Up] : Your patient, [unfilled] was seen in my office today for follow-up [Please see my note below.] : Please see my note below. [FreeTextEntry2] : dAelia Goodwin MD\par [address]\[place] [address]

## 2023-03-11 NOTE — HISTORY OF PRESENT ILLNESS
[FreeTextEntry1] : Miguel Singleton returns to the office today.  He is a 72-year-old man who I had previously performed a prostate biopsy on in September 2021.  He had been referred by Dr. Casey.  The patient had been following up with him and also received radiation therapy for prostate cancer diagnosed at that time.  The patient denies having received androgen deprivation therapy along with the radiation treatment.  His PSA level has declined since treatment and was last checked in August 2022 when it was 1.04 ng/mL, down from 10.4 prior to treatment.\par \par The patient has been experiencing irritative voiding symptoms since his radiation treatment.  These include his urgency and a rare episode of urge incontinence.  He does not need to use pads or diapers.  He notes increases in urinary frequency with less voided volumes but he does feel empty upon completion and has not experienced any weakness of the urinary stream.  He does drink tea at night which tends to reduce some nocturia.\par

## 2023-03-11 NOTE — LETTER CLOSING
[FreeTextEntry3] : Sincerely,\par \par \par \par \par Kam Ledesma MD, FACS\par Chief of Urology, [facility]\par  of Urology\par Director of Robotic and Laparoscopic Surgery\par [facility]\par \par [facility]\par [address] [place] [address]\[place] [address]\par P: [phone]\par F: [phone]\par [facility]

## 2023-11-30 ENCOUNTER — APPOINTMENT (OUTPATIENT)
Dept: ORTHOPEDIC SURGERY | Facility: CLINIC | Age: 73
End: 2023-11-30
Payer: MEDICARE

## 2023-11-30 PROCEDURE — 99213 OFFICE O/P EST LOW 20 MIN: CPT | Mod: 57

## 2023-11-30 PROCEDURE — 73560 X-RAY EXAM OF KNEE 1 OR 2: CPT | Mod: RT

## 2024-01-26 ENCOUNTER — OUTPATIENT (OUTPATIENT)
Dept: OUTPATIENT SERVICES | Facility: HOSPITAL | Age: 74
LOS: 1 days | End: 2024-01-26
Payer: MEDICARE

## 2024-01-26 VITALS
OXYGEN SATURATION: 99 % | HEART RATE: 65 BPM | RESPIRATION RATE: 16 BRPM | TEMPERATURE: 98 F | WEIGHT: 171.08 LBS | DIASTOLIC BLOOD PRESSURE: 83 MMHG | SYSTOLIC BLOOD PRESSURE: 147 MMHG | HEIGHT: 68 IN

## 2024-01-26 DIAGNOSIS — S83.231A COMPLEX TEAR OF MEDIAL MENISCUS, CURRENT INJURY, RIGHT KNEE, INITIAL ENCOUNTER: ICD-10-CM

## 2024-01-26 DIAGNOSIS — Z98.890 OTHER SPECIFIED POSTPROCEDURAL STATES: Chronic | ICD-10-CM

## 2024-01-26 DIAGNOSIS — S83.231D COMPLEX TEAR OF MEDIAL MENISCUS, CURRENT INJURY, RIGHT KNEE, SUBSEQUENT ENCOUNTER: ICD-10-CM

## 2024-01-26 DIAGNOSIS — Z01.818 ENCOUNTER FOR OTHER PREPROCEDURAL EXAMINATION: ICD-10-CM

## 2024-01-26 DIAGNOSIS — Z98.49 CATARACT EXTRACTION STATUS, UNSPECIFIED EYE: Chronic | ICD-10-CM

## 2024-01-26 LAB
ALBUMIN SERPL ELPH-MCNC: 4.9 G/DL — SIGNIFICANT CHANGE UP (ref 3.5–5.2)
ALP SERPL-CCNC: 102 U/L — SIGNIFICANT CHANGE UP (ref 30–115)
ALT FLD-CCNC: 22 U/L — SIGNIFICANT CHANGE UP (ref 0–41)
ANION GAP SERPL CALC-SCNC: 11 MMOL/L — SIGNIFICANT CHANGE UP (ref 7–14)
APTT BLD: 32.1 SEC — SIGNIFICANT CHANGE UP (ref 27–39.2)
AST SERPL-CCNC: 19 U/L — SIGNIFICANT CHANGE UP (ref 0–41)
BASOPHILS # BLD AUTO: 0.06 K/UL — SIGNIFICANT CHANGE UP (ref 0–0.2)
BASOPHILS NFR BLD AUTO: 1.5 % — HIGH (ref 0–1)
BILIRUB SERPL-MCNC: 0.5 MG/DL — SIGNIFICANT CHANGE UP (ref 0.2–1.2)
BUN SERPL-MCNC: 14 MG/DL — SIGNIFICANT CHANGE UP (ref 10–20)
CALCIUM SERPL-MCNC: 9.9 MG/DL — SIGNIFICANT CHANGE UP (ref 8.4–10.5)
CHLORIDE SERPL-SCNC: 103 MMOL/L — SIGNIFICANT CHANGE UP (ref 98–110)
CO2 SERPL-SCNC: 26 MMOL/L — SIGNIFICANT CHANGE UP (ref 17–32)
CREAT SERPL-MCNC: 1.1 MG/DL — SIGNIFICANT CHANGE UP (ref 0.7–1.5)
EGFR: 70 ML/MIN/1.73M2 — SIGNIFICANT CHANGE UP
EOSINOPHIL # BLD AUTO: 0.09 K/UL — SIGNIFICANT CHANGE UP (ref 0–0.7)
EOSINOPHIL NFR BLD AUTO: 2.2 % — SIGNIFICANT CHANGE UP (ref 0–8)
GLUCOSE SERPL-MCNC: 158 MG/DL — HIGH (ref 70–99)
HCT VFR BLD CALC: 40.8 % — LOW (ref 42–52)
HGB BLD-MCNC: 14.4 G/DL — SIGNIFICANT CHANGE UP (ref 14–18)
IMM GRANULOCYTES NFR BLD AUTO: 0.2 % — SIGNIFICANT CHANGE UP (ref 0.1–0.3)
INR BLD: 1.05 RATIO — SIGNIFICANT CHANGE UP (ref 0.65–1.3)
LYMPHOCYTES # BLD AUTO: 2.02 K/UL — SIGNIFICANT CHANGE UP (ref 1.2–3.4)
LYMPHOCYTES # BLD AUTO: 49.1 % — SIGNIFICANT CHANGE UP (ref 20.5–51.1)
MCHC RBC-ENTMCNC: 30.5 PG — SIGNIFICANT CHANGE UP (ref 27–31)
MCHC RBC-ENTMCNC: 35.3 G/DL — SIGNIFICANT CHANGE UP (ref 32–37)
MCV RBC AUTO: 86.4 FL — SIGNIFICANT CHANGE UP (ref 80–94)
MONOCYTES # BLD AUTO: 0.36 K/UL — SIGNIFICANT CHANGE UP (ref 0.1–0.6)
MONOCYTES NFR BLD AUTO: 8.8 % — SIGNIFICANT CHANGE UP (ref 1.7–9.3)
NEUTROPHILS # BLD AUTO: 1.57 K/UL — SIGNIFICANT CHANGE UP (ref 1.4–6.5)
NEUTROPHILS NFR BLD AUTO: 38.2 % — LOW (ref 42.2–75.2)
NRBC # BLD: 0 /100 WBCS — SIGNIFICANT CHANGE UP (ref 0–0)
PLATELET # BLD AUTO: 181 K/UL — SIGNIFICANT CHANGE UP (ref 130–400)
PMV BLD: 11 FL — HIGH (ref 7.4–10.4)
POTASSIUM SERPL-MCNC: 4.7 MMOL/L — SIGNIFICANT CHANGE UP (ref 3.5–5)
POTASSIUM SERPL-SCNC: 4.7 MMOL/L — SIGNIFICANT CHANGE UP (ref 3.5–5)
PROT SERPL-MCNC: 8.2 G/DL — HIGH (ref 6–8)
PROTHROM AB SERPL-ACNC: 12 SEC — SIGNIFICANT CHANGE UP (ref 9.95–12.87)
RBC # BLD: 4.72 M/UL — SIGNIFICANT CHANGE UP (ref 4.7–6.1)
RBC # FLD: 12.9 % — SIGNIFICANT CHANGE UP (ref 11.5–14.5)
SODIUM SERPL-SCNC: 140 MMOL/L — SIGNIFICANT CHANGE UP (ref 135–146)
WBC # BLD: 4.11 K/UL — LOW (ref 4.8–10.8)
WBC # FLD AUTO: 4.11 K/UL — LOW (ref 4.8–10.8)

## 2024-01-26 PROCEDURE — 99214 OFFICE O/P EST MOD 30 MIN: CPT | Mod: 25

## 2024-01-26 PROCEDURE — 80053 COMPREHEN METABOLIC PANEL: CPT

## 2024-01-26 PROCEDURE — 85610 PROTHROMBIN TIME: CPT

## 2024-01-26 PROCEDURE — 36415 COLL VENOUS BLD VENIPUNCTURE: CPT

## 2024-01-26 PROCEDURE — 93010 ELECTROCARDIOGRAM REPORT: CPT

## 2024-01-26 PROCEDURE — 93005 ELECTROCARDIOGRAM TRACING: CPT

## 2024-01-26 PROCEDURE — 85730 THROMBOPLASTIN TIME PARTIAL: CPT

## 2024-01-26 PROCEDURE — 85025 COMPLETE CBC W/AUTO DIFF WBC: CPT

## 2024-01-26 NOTE — H&P PST ADULT - NSANTHOSAYNRD_GEN_A_CORE
No. TOMI screening performed.  STOP BANG Legend: 0-2 = LOW Risk; 3-4 = INTERMEDIATE Risk; 5-8 = HIGH Risk

## 2024-01-26 NOTE — H&P PST ADULT - NSICDXPASTMEDICALHX_GEN_ALL_CORE_FT
PAST MEDICAL HISTORY:  Cataracts, bilateral     Environmental allergies     Glaucoma     History of therapeutic radiation     Prostate cancer     Torn meniscus

## 2024-01-26 NOTE — H&P PST ADULT - REASON FOR ADMISSION
Patient is a ___73__ year old ___male presenting to [facility] in preparation for surgical arthroscopy right knee______ on 02/09/24______ under ____gen___ anesthesia by  ____Rancho_____ .

## 2024-01-26 NOTE — H&P PST ADULT - NSICDXPASTSURGICALHX_GEN_ALL_CORE_FT
PAST SURGICAL HISTORY:  H/O cataract extraction     H/O colonoscopy 2016    History of surgery LEFT EYE CATARACT EXTRACTION WITH LENS IMPLANT

## 2024-01-26 NOTE — H&P PST ADULT - HISTORY OF PRESENT ILLNESS
CC: pt twisted his knee 1.5 years ago; he is very active and didn't rest; he has a torn meniscus; he had MRIs done and went to PT; in september 2023 it started activing up again; gets swollen; it interferes with some ADLs like walking/running    FYI: he has a fixed bridge which broke but he's going to his dentist before surgery     Mallampati: 3    FOS: 1    Anesthesia Alert  NO--Difficult Airway  NO--History of neck surgery or radiation  NO--Limited ROM of neck  NO--History of Malignant hyperthermia  NO--Personal or family history of Pseudocholinesterase deficiency.  NO--Prior Anesthesia Complication  NO--Latex Allergy  NO--Loose teeth  NO--History of Rheumatoid Arthritis  NO--TOMI  NO--Bleeding risk  NO--Other_____    Revised Cardiac Risk Index for Pre-Operative Risk from [employer]  on 1/26/2024  ** All calculations should be rechecked by clinician prior to use **    RESULT SUMMARY:  0 points  Class I Risk    3.9 %  30-day risk of death, MI, or cardiac arrest    From Ducariel 2017. These numbers are higher than those from the original study (Lazaro 1999). See Evidence for details.      INPUTS:  Elevated-risk surgery —> 0 = No  History of ischemic heart disease —> 0 = No  History of congestive heart failure —> 0 = No  History of cerebrovascular disease —> 0 = No  Pre-operative treatment with insulin —> 0 = No  Pre-operative creatinine >2 mg/dL / 176.8 µmol/L —> 0 = No    Duke Activity Status Index (DASI) from [employer]  on 1/26/2024  ** All calculations should be rechecked by clinician prior to use **    RESULT SUMMARY:  42.7 points  The higher the score (maximum 58.2), the higher the functional status.    7.99 METs        INPUTS:  Take care of self —> 2.75 = Yes  Walk indoors —> 1.75 = Yes  Walk 1&ndash;2 blocks on level ground —> 2.75 = Yes  Climb a flight of stairs or walk up a hill —> 5.5 = Yes  Run a short distance —> 0 = No  Do light work around the house —> 2.7 = Yes  Do moderate work around the house —> 3.5 = Yes  Do heavy work around the house —> 8 = Yes  Do yardwork —> 4.5 = Yes  Have sexual relations —> 5.25 = Yes  Participate in moderate recreational activities —> 6 = Yes  Participate in strenuous sports —> 0 = No

## 2024-01-27 DIAGNOSIS — Z01.818 ENCOUNTER FOR OTHER PREPROCEDURAL EXAMINATION: ICD-10-CM

## 2024-01-27 DIAGNOSIS — S83.231D COMPLEX TEAR OF MEDIAL MENISCUS, CURRENT INJURY, RIGHT KNEE, SUBSEQUENT ENCOUNTER: ICD-10-CM

## 2024-02-09 ENCOUNTER — APPOINTMENT (OUTPATIENT)
Dept: ORTHOPEDIC SURGERY | Facility: AMBULATORY SURGERY CENTER | Age: 74
End: 2024-02-09

## 2024-02-09 ENCOUNTER — OUTPATIENT (OUTPATIENT)
Dept: OUTPATIENT SERVICES | Facility: HOSPITAL | Age: 74
LOS: 1 days | Discharge: HOME | End: 2024-02-09
Payer: MEDICARE

## 2024-02-09 ENCOUNTER — TRANSCRIPTION ENCOUNTER (OUTPATIENT)
Age: 74
End: 2024-02-09

## 2024-02-09 VITALS
HEART RATE: 60 BPM | TEMPERATURE: 98 F | SYSTOLIC BLOOD PRESSURE: 130 MMHG | OXYGEN SATURATION: 98 % | RESPIRATION RATE: 18 BRPM | HEIGHT: 68 IN | WEIGHT: 171.08 LBS | DIASTOLIC BLOOD PRESSURE: 64 MMHG

## 2024-02-09 VITALS
RESPIRATION RATE: 14 BRPM | HEART RATE: 68 BPM | SYSTOLIC BLOOD PRESSURE: 142 MMHG | OXYGEN SATURATION: 98 % | DIASTOLIC BLOOD PRESSURE: 80 MMHG

## 2024-02-09 DIAGNOSIS — Z98.890 OTHER SPECIFIED POSTPROCEDURAL STATES: Chronic | ICD-10-CM

## 2024-02-09 DIAGNOSIS — Z98.49 CATARACT EXTRACTION STATUS, UNSPECIFIED EYE: Chronic | ICD-10-CM

## 2024-02-09 DIAGNOSIS — S83.231A COMPLEX TEAR OF MEDIAL MENISCUS, CURRENT INJURY, RIGHT KNEE, INITIAL ENCOUNTER: ICD-10-CM

## 2024-02-09 PROCEDURE — 97116 GAIT TRAINING THERAPY: CPT | Mod: GP

## 2024-02-09 PROCEDURE — 29881 ARTHRS KNE SRG MNISECTMY M/L: CPT | Mod: RT

## 2024-02-09 RX ORDER — OXYCODONE AND ACETAMINOPHEN 5; 325 MG/1; MG/1
1 TABLET ORAL EVERY 4 HOURS
Refills: 0 | Status: DISCONTINUED | OUTPATIENT
Start: 2024-02-09 | End: 2024-02-09

## 2024-02-09 RX ORDER — ONDANSETRON 8 MG/1
4 TABLET, FILM COATED ORAL ONCE
Refills: 0 | Status: DISCONTINUED | OUTPATIENT
Start: 2024-02-09 | End: 2024-02-09

## 2024-02-09 RX ORDER — LATANOPROST 0.05 MG/ML
1 SOLUTION/ DROPS OPHTHALMIC; TOPICAL
Refills: 0 | DISCHARGE

## 2024-02-09 RX ORDER — HYDROMORPHONE HYDROCHLORIDE 2 MG/ML
0.5 INJECTION INTRAMUSCULAR; INTRAVENOUS; SUBCUTANEOUS
Refills: 0 | Status: DISCONTINUED | OUTPATIENT
Start: 2024-02-09 | End: 2024-02-09

## 2024-02-09 NOTE — ASU DISCHARGE PLAN (ADULT/PEDIATRIC) - CARE PROVIDER_API CALL
Ishaan Vickers  Orthopaedic Surgery  [address] [place] [address]  Phone: [phone]  Fax: [phone]  Follow Up Time:

## 2024-02-09 NOTE — CHART NOTE - NSCHARTNOTEFT_GEN_A_CORE
PACU ANESTHESIA PACU ADMISSION NOTE      Procedure:Arthroscopic medial meniscectomy      Post op diagnosisMedial meniscus tear        ____ Intubated  TV:______       Rate: ______      FiO2: ______    _x___ Patent Airway    ____ Full return of protective reflexes    ____ Full recovery from anesthesia / sedation to baseline status    Viitals:  see anesthesia record            Mental Status:  __x__ Awake   _____ Alert   _____ Drowsy   _____ Sedated    Nausea/Vomiting: ____ Yes, See Post - Op Orders      __x__ No    Pain Scale (0-10): _____    Treatment: ____ None    _x___ See Post - Op/PCA Orders    Post - Operative Fluids:   ____ Oral   __x_ See Post - Op Orders    Plan:         Discharge:   __x__Home       _____Floor         _____Critical Care    _____Other:_________________    Comments: uneventful perioperative course; no s/s of anesthesia complications noted; D/c home when criteria met

## 2024-02-09 NOTE — ASU DISCHARGE PLAN (ADULT/PEDIATRIC) - NS MD DC FALL RISK RISK
done For information on Fall & Injury Prevention, visit: [email or web address] OR  [email or web address] OR  https://www.cdc.gov/steadi/patient.html

## 2024-02-09 NOTE — PRE-ANESTHESIA EVALUATION ADULT - NSPROPOSEDPROCEDFT_GEN_ALL_CORE
Per providers order patient was notified of - COVID results. Patient verbalized an understanding and had no further questions or concerns.     ----- Message from Cydney Arguelles PA-C sent at 4/21/2021  7:47 PM CDT -----  Please inform the patient covid test was negative       
right knee arthroscopy

## 2024-02-13 DIAGNOSIS — S83.231A COMPLEX TEAR OF MEDIAL MENISCUS, CURRENT INJURY, RIGHT KNEE, INITIAL ENCOUNTER: ICD-10-CM
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
## 2024-02-13 DIAGNOSIS — Z85.46 PERSONAL HISTORY OF MALIGNANT NEOPLASM OF PROSTATE: ICD-10-CM

## 2024-02-16 ENCOUNTER — APPOINTMENT (OUTPATIENT)
Dept: ORTHOPEDIC SURGERY | Facility: CLINIC | Age: 74
End: 2024-02-16
Payer: MEDICARE

## 2024-02-16 PROCEDURE — 99024 POSTOP FOLLOW-UP VISIT: CPT

## 2024-02-16 NOTE — DISCUSSION/SUMMARY
[de-identified] : Impression: 1 week status post right knee arthroscopy with partial meniscectomy.  Plan: Physical therapy. Activity as tolerated. Weightbearing as tolerated.  Follow-up: 4 weeks with Dr. Vickers.

## 2024-02-16 NOTE — HISTORY OF PRESENT ILLNESS
[de-identified] : 1 week status post Right knee arthroscopy With Dr. Vickers,  Patient denies any significant pain or discomfort. Denies any fever. Doing well, admits to improvement of symptoms per

## 2024-02-16 NOTE — PHYSICAL EXAM
[de-identified] : Examination of the right knee, mild swelling, no ecchymosis, no erythema, no warmth to palpation. Mild generalized tenderness to palpation. Mild stiffness to range of motion to the knee. Surgical incision well-healed, no signs of infection. Calf is soft, nontender. Neurovascular intact. Mild antalgic gait.

## 2024-03-12 NOTE — H&P PST ADULT - MUSCULOSKELETAL
normal/ROM intact/normal gait/strength 5/5 bilateral upper extremities/strength 5/5 bilateral lower extremities
no

## 2024-03-18 ENCOUNTER — APPOINTMENT (OUTPATIENT)
Dept: ORTHOPEDIC SURGERY | Facility: CLINIC | Age: 74
End: 2024-03-18
Payer: MEDICARE

## 2024-03-18 PROBLEM — H40.9 UNSPECIFIED GLAUCOMA: Chronic | Status: ACTIVE | Noted: 2024-01-26

## 2024-03-18 PROBLEM — S83.209A UNSPECIFIED TEAR OF UNSPECIFIED MENISCUS, CURRENT INJURY, UNSPECIFIED KNEE, INITIAL ENCOUNTER: Chronic | Status: ACTIVE | Noted: 2024-01-26

## 2024-03-18 PROBLEM — Z92.3 PERSONAL HISTORY OF IRRADIATION: Chronic | Status: ACTIVE | Noted: 2024-01-26

## 2024-03-18 PROBLEM — C61 MALIGNANT NEOPLASM OF PROSTATE: Chronic | Status: ACTIVE | Noted: 2024-01-26

## 2024-03-18 PROCEDURE — 99024 POSTOP FOLLOW-UP VISIT: CPT

## 2024-05-21 ENCOUNTER — APPOINTMENT (OUTPATIENT)
Dept: UROLOGY | Facility: CLINIC | Age: 74
End: 2024-05-21
Payer: MEDICARE

## 2024-05-21 VITALS
DIASTOLIC BLOOD PRESSURE: 87 MMHG | HEIGHT: 67 IN | OXYGEN SATURATION: 94 % | WEIGHT: 160 LBS | BODY MASS INDEX: 25.11 KG/M2 | HEART RATE: 82 BPM | SYSTOLIC BLOOD PRESSURE: 127 MMHG

## 2024-05-21 DIAGNOSIS — N32.81 OVERACTIVE BLADDER: ICD-10-CM

## 2024-05-21 DIAGNOSIS — C61 MALIGNANT NEOPLASM OF PROSTATE: ICD-10-CM

## 2024-05-21 PROCEDURE — G2211 COMPLEX E/M VISIT ADD ON: CPT

## 2024-05-21 PROCEDURE — 99214 OFFICE O/P EST MOD 30 MIN: CPT

## 2024-05-21 RX ORDER — METFORMIN HYDROCHLORIDE 625 MG/1
TABLET ORAL
Refills: 0 | Status: ACTIVE | COMMUNITY

## 2024-05-21 RX ORDER — SILDENAFIL 100 MG/1
100 TABLET, FILM COATED ORAL
Qty: 5 | Refills: 10 | Status: DISCONTINUED | COMMUNITY
Start: 2022-08-26 | End: 2024-05-21

## 2024-05-21 RX ORDER — MIRABEGRON 25 MG/1
25 TABLET, EXTENDED RELEASE ORAL
Qty: 90 | Refills: 3 | Status: ACTIVE | COMMUNITY
Start: 2023-03-09 | End: 1900-01-01

## 2024-05-21 RX ORDER — HYDROCODONE BITARTRATE AND ACETAMINOPHEN 7.5; 325 MG/1; MG/1
7.5-325 TABLET ORAL
Qty: 21 | Refills: 0 | Status: DISCONTINUED | COMMUNITY
Start: 2024-02-09 | End: 2024-05-21

## 2024-05-22 LAB
ESTIMATED AVERAGE GLUCOSE: 123 MG/DL
HBA1C MFR BLD HPLC: 5.9 %
PSA SERPL-MCNC: 0.06 NG/ML

## 2024-05-22 NOTE — LETTER GREETING
[Dear  ___] : Dear  [unfilled], [Follow-Up] : Your patient, [unfilled] was seen in my office today for follow-up [Please see my note below.] : Please see my note below. [FreeTextEntry2] : Adelia Goodwin MD [address] [place] [address]

## 2024-05-22 NOTE — HISTORY OF PRESENT ILLNESS
[FreeTextEntry1] : Miguel Singleton returns to the office today.  He is a 73-year-old man who I had previously performed a prostate biopsy on in September 2021.  He had been referred by Dr. Casey.  The patient had been following up with him and also received radiation therapy for prostate cancer diagnosed at that time.  The patient denies having received androgen deprivation therapy along with the radiation treatment.  His PSA level has declined since treatment and was last checked in March 2023 when it was 0.26 ng/mL, down from 10.4 prior to treatment.  He was previously on Myrbetriq but discontinued it as he was feeling better. He would like a prescription just in case. He has a good urinary stream. He does experience urinary frequency, but it is improved from before he believes related to his diabetes.   He was hospitalized last month for diabetes and an uncontrolled glucose level.  Apparently his hemoglobin a1c was 14% recently, he is following up with an endocrinologist.  Had been eating candy, ice cream, hot chocolate.

## 2024-05-22 NOTE — LETTER CLOSING
[FreeTextEntry3] : Sincerely,      Kam Ledesma MD, FACS Director of Urology Services, [facility] Chief of Urology, [facility]  of Urology   [facility], [place] [id number] P: [phone] F: [phone] [facility]

## 2024-05-29 ENCOUNTER — APPOINTMENT (OUTPATIENT)
Dept: ORTHOPEDIC SURGERY | Facility: CLINIC | Age: 74
End: 2024-05-29
Payer: MEDICARE

## 2024-05-29 DIAGNOSIS — M77.8 OTHER ENTHESOPATHIES, NOT ELSEWHERE CLASSIFIED: ICD-10-CM

## 2024-05-29 DIAGNOSIS — S83.231D COMPLEX TEAR OF MEDIAL MENISCUS, CURRENT INJURY, RIGHT KNEE, SUBSEQUENT ENCOUNTER: ICD-10-CM

## 2024-05-29 DIAGNOSIS — S83.232A COMPLEX TEAR OF MEDIAL MENISCUS, CURRENT INJURY, LEFT KNEE, INITIAL ENCOUNTER: ICD-10-CM

## 2024-05-29 DIAGNOSIS — Z98.890 OTHER SPECIFIED POSTPROCEDURAL STATES: ICD-10-CM

## 2024-05-29 PROCEDURE — 99213 OFFICE O/P EST LOW 20 MIN: CPT

## 2024-05-29 NOTE — DISCUSSION/SUMMARY
[de-identified] : Impression: 3 status post right knee arthroscopy with partial meniscectomy.  Plan: home therapy. Activity as tolerated. Weightbearing as tolerated.  Follow-up: 3 months prn Will reassess treatment of the left knee meniscus tear or right forearm

## 2024-05-29 NOTE — PHYSICAL EXAM
[de-identified] : Examination of the right knee, mild swelling, no ecchymosis, no erythema, no warmth to palpation. Mild generalized tenderness to palpation. Mild stiffness to range of motion to the knee. Surgical incision well-healed, no signs of infection. Calf is soft, nontender. Neurovascular intact. Mild antalgic gait. Left knee pain on the medial joint line MRI left knee 11/15/2022 medial tear mild right proximal forearm tenderness no masses

## 2024-05-29 NOTE — REASON FOR VISIT
[FreeTextEntry2] : right knee pain slowly doing better   can't kneel   still some left knee pain   lifting 5 lbs  some right forearm pain

## 2024-05-29 NOTE — HISTORY OF PRESENT ILLNESS
[de-identified] : 1 week status post Right knee arthroscopy With Dr. Vickers,  Patient denies any significant pain or discomfort. Denies any fever. Doing well, admits to improvement of symptoms per

## 2024-06-05 ENCOUNTER — APPOINTMENT (OUTPATIENT)
Dept: ORTHOPEDIC SURGERY | Facility: CLINIC | Age: 74
End: 2024-06-05
Payer: MEDICARE

## 2024-06-05 VITALS — BODY MASS INDEX: 24.55 KG/M2 | HEIGHT: 68 IN | WEIGHT: 162 LBS

## 2024-06-05 DIAGNOSIS — M65.312 TRIGGER THUMB, LEFT THUMB: ICD-10-CM

## 2024-06-05 PROCEDURE — 99214 OFFICE O/P EST MOD 30 MIN: CPT

## 2024-06-05 NOTE — DISCUSSION/SUMMARY
[de-identified] : Today we discussed treatment options including observation, cortisone injection, and trigger finger release. The risks and benefits of a cortisone injection were explained to the patient. He understands that sometimes there is a need for second injection.  If the second injection fails, he may want to consider a trigger finger release. He would like to hold off on an injection today because he is going out of town for about a month. He will fu when he returns. All questions were answers.

## 2024-06-05 NOTE — HISTORY OF PRESENT ILLNESS
[de-identified] : Patient is a 72 yo male here for evaluation of his left thumb. He has been complaining of clicking and locking of the digit for a few weeks.

## 2024-06-05 NOTE — PHYSICAL EXAM
[de-identified] : Physical exam of his left thumb: Mild swelling, negative ecchymosis. Tenderness over the A1 pulley. Active locking and clicking and locking of the digit. Sensory and motor are intact.

## 2024-07-30 ENCOUNTER — APPOINTMENT (OUTPATIENT)
Dept: ORTHOPEDIC SURGERY | Facility: CLINIC | Age: 74
End: 2024-07-30

## 2024-08-06 ENCOUNTER — APPOINTMENT (OUTPATIENT)
Dept: ORTHOPEDIC SURGERY | Facility: CLINIC | Age: 74
End: 2024-08-06

## 2024-08-06 PROBLEM — S49.91XA RIGHT SHOULDER INJURY, INITIAL ENCOUNTER: Status: ACTIVE | Noted: 2024-08-06

## 2024-08-06 PROBLEM — M54.12 CERVICAL RADICULOPATHY, ACUTE: Status: ACTIVE | Noted: 2024-08-06

## 2024-08-06 PROCEDURE — 99213 OFFICE O/P EST LOW 20 MIN: CPT

## 2024-08-06 PROCEDURE — 73030 X-RAY EXAM OF SHOULDER: CPT | Mod: RT

## 2024-08-06 PROCEDURE — 72050 X-RAY EXAM NECK SPINE 4/5VWS: CPT

## 2024-08-06 NOTE — ASSESSMENT
[FreeTextEntry1] : 74-year-old male for evaluation status post a right shoulder injury.  Patient reports he was carrying a weight of his brother who had a stroke when he felt a pull to the anterior aspect of his right shoulder.  He reports pain and difficulty with range of motion since time of injury.  Also ports some pain in his right trapezius area with subsequent shooting pains down his right upper extremity.  Denies any numbness or tingling.  Physical examination right shoulder: No swelling, ecchymosis, erythema appreciated.  Skin is intact.  No midline neck or paraspinal tenderness.  Mild tenderness of the anterior glenohumeral joint and lateral deltoid.  Mild tenderness in the right trapezius area.  Good range of motion upon flexion and abduction of the left shoulder without any limitations.  4-5 strength.  Negative Servin.  Negative Sumter's.  Negative drop arm testing.  Sensorimotor intact distally.  Neurovascularly intact.   X-rays of right shoulder taken in the office today,  No acute fractures, subluxations, or dislocations.  Treatment plan as discussed:  My clinical suspicion is high for sprain of the shoulder given the patient's history, physical examination findings, and x-ray findings.  I recommended anti-inflammatory medication.  Ibuprofen 600 mg sent to patient's pharmacy to be taken as needed for pain. Benefits discussed. Confirmed no contraindication to NSAIDs. Also, tizanidine sent to patient's pharmacy to be taken as needed for pain.  Advised patient that this medication may make the patient drowsy and so to avoid any driving or operating heavy machinery when taking it. Red flag symptoms discussed.  Risks were discussed. Patient expresses full understanding.  Script for physical therapy provided for improved ROM and strengthening of surrounding musculature. Benefits discussed.  I recommended patient rest, ice, compress, and elevate the arm regularly. Encouraged activity modification as tolerable. Encouraged gentle range of motion to avoid stiffness.  All questions and concerns addressed to patient's satisfaction. Patient expresses full understanding of treatment plan. Patient will follow up with Beny in 6 weeks for further evaluation and treatment. will consider MRI in repeat evaluation if patient's symptoms do not improve.

## 2024-08-29 ENCOUNTER — APPOINTMENT (OUTPATIENT)
Dept: ORTHOPEDIC SURGERY | Facility: CLINIC | Age: 74
End: 2024-08-29

## 2024-09-12 ENCOUNTER — APPOINTMENT (OUTPATIENT)
Dept: ORTHOPEDIC SURGERY | Facility: CLINIC | Age: 74
End: 2024-09-12
Payer: MEDICARE

## 2024-09-12 DIAGNOSIS — S43.431D SUPERIOR GLENOID LABRUM LESION OF RIGHT SHOULDER, SUBSEQUENT ENCOUNTER: ICD-10-CM

## 2024-09-12 PROCEDURE — 99213 OFFICE O/P EST LOW 20 MIN: CPT

## 2024-09-12 NOTE — HISTORY OF PRESENT ILLNESS
[de-identified] : Patient is here for follow-up on the right shoulder he is 40% better with therapy he does not want to take any anti-inflammatories x-rays show no glenohumeral joint arthritis and no soft tissue calcifications from prior visit he is right-hand dominant retired  Physical exam is Full range of motion good strength the rotator cuff resistance positive [place]'s minimal impingement right shoulder exam  Diagnosis SLAP tear right shoulder  Recommend continue with therapy new prescription was given we will see him back in 2 months if he fails to improve we will order further imaging studies

## 2024-10-28 ENCOUNTER — APPOINTMENT (OUTPATIENT)
Dept: ORTHOPEDIC SURGERY | Facility: CLINIC | Age: 74
End: 2024-10-28

## 2025-06-24 ENCOUNTER — NON-APPOINTMENT (OUTPATIENT)
Age: 75
End: 2025-06-24

## 2025-06-24 ENCOUNTER — APPOINTMENT (OUTPATIENT)
Dept: UROLOGY | Facility: CLINIC | Age: 75
End: 2025-06-24
Payer: MEDICARE

## 2025-06-24 VITALS
DIASTOLIC BLOOD PRESSURE: 71 MMHG | WEIGHT: 165 LBS | OXYGEN SATURATION: 99 % | HEART RATE: 58 BPM | HEIGHT: 68 IN | BODY MASS INDEX: 25.01 KG/M2 | SYSTOLIC BLOOD PRESSURE: 151 MMHG

## 2025-06-24 PROCEDURE — 99213 OFFICE O/P EST LOW 20 MIN: CPT

## 2025-06-24 PROCEDURE — G2211 COMPLEX E/M VISIT ADD ON: CPT

## 2025-06-25 LAB — PSA SERPL-MCNC: 0.04 NG/ML
